# Patient Record
Sex: FEMALE | Employment: OTHER
[De-identification: names, ages, dates, MRNs, and addresses within clinical notes are randomized per-mention and may not be internally consistent; named-entity substitution may affect disease eponyms.]

---

## 2023-05-04 ENCOUNTER — TRANSCRIBE ORDERS (OUTPATIENT)
Dept: OTHER | Age: 73
End: 2023-05-04

## 2023-05-04 DIAGNOSIS — G35 MULTIPLE SCLEROSIS (H): Primary | ICD-10-CM

## 2023-05-10 ENCOUNTER — HOSPITAL ENCOUNTER (OUTPATIENT)
Dept: PHYSICAL THERAPY | Facility: CLINIC | Age: 73
Discharge: HOME | End: 2023-05-10
Attending: NURSE PRACTITIONER
Payer: COMMERCIAL

## 2023-05-10 DIAGNOSIS — G35 MULTIPLE SCLEROSIS (H): Primary | ICD-10-CM

## 2023-05-10 DIAGNOSIS — R29.898 WEAKNESS OF BOTH LOWER EXTREMITIES: ICD-10-CM

## 2023-05-10 DIAGNOSIS — Z74.09 IMPAIRED FUNCTIONAL MOBILITY, BALANCE, GAIT, AND ENDURANCE: ICD-10-CM

## 2023-05-10 PROCEDURE — 97162 PT EVAL MOD COMPLEX 30 MIN: CPT | Mod: GP | Performed by: PHYSICAL THERAPIST

## 2023-05-10 PROCEDURE — 97110 THERAPEUTIC EXERCISES: CPT | Mod: GP | Performed by: PHYSICAL THERAPIST

## 2023-05-14 NOTE — PROGRESS NOTES
05/10/23 1430   Quick Adds   Quick Adds Certification   Type of Visit Initial OP PT Evaluation   General Information   Start of Care Date 05/10/23   Referring Physician Joselyn Hernandez NP   Orders Evaluate and Treat as Indicated   Order Date 05/04/23   Medical Diagnosis MS   Onset of illness/injury or Date of Surgery 05/04/23  (date of order)   Surgical/Medical history reviewed Yes   Pertinent history of current problem (include personal factors and/or comorbidities that impact the POC) Pt was diagnosed with MS about 18 years ago.  She first developed fatigue many years ago and has gradually reduced strength and ease of mobility.  Current she has mm tone which worsens as the day progresses or during weather changes. PMH: arthritis, depression, HTN, incontienence, overweight, orthoscopic surgery on R knee, gallbladder. She has been taking baclofen for tone management with some relief. Currently completing the following exercises: squats, diagonal forward bends, trunk rotation with arms extended. Mobiility issues: difficulty getting up from chairs, walking, balance, stair climbing when she visits family.  Difficulty with car transfers and she needs to physically lift her legs by her pants with her hands to get them into the car. She also needs to use this method when moving in and out of bed. Pt uses 4WW for mobility and scooter for long distance   Patient/Family Goals Statement improve ease of walking and mobility   Fall Risk Screen   Fall screen completed by PT   Have you fallen 2 or more times in the past year? Yes   Have you fallen and had an injury in the past year? No   Timed Up and Go score (seconds) 66.47  (with walker)   Is patient a fall risk? Yes   Abuse Screen (yes response referral indicated)   Feels Unsafe at Home or Work/School no   Feels Threatened by Someone no   Does Anyone Try to Keep You From Having Contact with Others or Doing Things Outside Your Home? no   Physical Signs of Abuse Present no    Pain   Patient currently in pain No   Cognitive Status Examination   Orientation orientation to person, place and time   Level of Consciousness alert   Posture   Posture Forward head position;Protracted shoulders   Range of Motion (ROM)   ROM Comment PROM of B LEs WFL   Strength   Strength Comments Hip flexion: 3/5, hip abduction 2-/5, hip adduction 2/5, knee extension 3/5, knee flexion 2-/5, DF: 2/5   Bed Mobility   Bed Mobility Comments min to modA for LEs with supine<>sit on flat plynth.  Pt used arms to slide back onto plynth then pulled legs up using pants to moving into long sitting then into supine. Supine to long sit via use of UEs then moving to EOM with UEs moving her legs   Transfer Skills   Transfer Comments sit to stand with heavy UE support from armed chair, increased time to complete due to leg stiffness   Gait   Gait Comments Very slow speed, no knee flexion on R during swing phase leading to difficulty with foot clearance, very short step length and lacks heel strike B.   Gait Special Tests   Gait Special Tests 25 FOOT TIMED WALK   Gait Special Tests 25 Foot Timed Walk   Steps 26 Steps   Comments 10 MWT: 27.71 seconds.  27 steps with 4WW.  Gait speed: 0.2 m/s   Balance   Balance Comments Sitting balance: Independent; standing balance: pt requires B UE support for balance   Sensory Examination   Sensory Perception Comments intact to light touch in B LEs   Muscle Tone   Muscle Tone Comments increased tone in B LEs   Planned Therapy Interventions   Planned Therapy Interventions bed mobility training;gait training;neuromuscular re-education;strengthening;stretching;transfer training   Clinical Impression   Criteria for Skilled Therapeutic Interventions Met yes, treatment indicated   PT Diagnosis decreased functional moiblity   Influenced by the following impairments decreased strength, impaired balance, fall risk, increased mm tone   Functional limitations due to impairments diffiuclty with bed  mobility, transfers and gait leading to reduced participation and moiblity within and outside of the home   Clinical Presentation Evolving/Changing   Clinical Presentation Rationale presentation, PMH, weakness, moiblity issues, good family support   Clinical Decision Making (Complexity) Moderate complexity   Therapy Frequency 1 time/week   Predicted Duration of Therapy Intervention (days/wks) up to 90 days   Risk & Benefits of therapy have been explained Yes   Patient, Family & other staff in agreement with plan of care Yes   Education Assessment   Preferred Learning Style Demonstration;Pictures/video   Barriers to Learning No barriers   GOALS   PT Eval Goals 1;2;3;4;5   Goal 1   Goal Identifier TUG   Goal Description Pt will complete the timed up and go in 55 seconds with AAD in order to ease household mobility   Goal Progress baseline: 66 seconds with 4WW   Target Date 08/07/23   Goal 2   Goal Identifier Transfers   Goal Description Pt will be able to complete car transfer with SBA and appropriate equipment to lift legs in/out of vehicle   Target Date 08/07/23   Goal 3   Goal Identifier Bed moiblity   Goal Description Pt will be able to complete sit <>supine with approrpiate adaptive equipment with SBA in a timely manner.   Target Date 08/07/23   Goal 4   Goal Identifier Stairs   Goal Description Pt will be able to ascend/descend 4 steps with railings with CGA in order to enter family member's house   Target Date 08/07/23   Goal 5   Goal Identifier HEP   Goal Description Pt will be independent with a HEP to self manage symptoms   Target Date 08/07/23   Total Evaluation Time   PT Eval, Moderate Complexity Minutes (71072) 35   Therapy Certification   Certification date from 05/10/23   Certification date to 08/07/23   Medical Diagnosis MS

## 2023-05-14 NOTE — PROGRESS NOTES
[facility]                                                                                   OUTPATIENT PHYSICAL THERAPY FUNCTIONAL EVALUATION  PLAN OF TREATMENT FOR OUTPATIENT REHABILITATION  (COMPLETE FOR INITIAL CLAIMS ONLY)  Patient's Last Name, First Name, M.I.  YOB: 1950  Opal Bobo     Provider's Name   [facility]   Medical Record No.  [id number]     Start of Care Date:  05/10/23   Onset Date:  05/04/23 (date of order)   Type:     _X__PT   ____OT  ____SLP Medical Diagnosis:  MS     PT Diagnosis:  decreased functional moiblity Visits from SOC:  1                              __________________________________________________________________________________  Plan of Treatment/Functional Goals:  bed mobility training, gait training, neuromuscular re-education, strengthening, stretching, transfer training           GOALS  TUG  Pt will complete the timed up and go in 55 seconds with AAD in order to ease household mobility  08/07/23    Transfers  Pt will be able to complete car transfer with SBA and appropriate equipment to lift legs in/out of vehicle  08/07/23    Bed moiblity  Pt will be able to complete sit <>supine with approrpiate adaptive equipment with SBA in a timely manner.  08/07/23    Stairs  Pt will be able to ascend/descend 4 steps with railings with CGA in order to enter family member's house  08/07/23    HEP  Pt will be independent with a HEP to self manage symptoms  08/07/23            Therapy Frequency:  1 time/week   Predicted Duration of Therapy Intervention:  up to 90 days    Filemon Thomas, PT                                    I CERTIFY THE NEED FOR THESE SERVICES FURNISHED UNDER        THIS PLAN OF TREATMENT AND WHILE UNDER MY CARE .             Physician Signature                Date    X_____________________________________________________                      Certification Date From:  05/10/23   Certification Date To:  08/07/23    Referring Provider:  Joselyn Hernandez NP    Initial Assessment  See Epic Evaluation- Start of Care Date: 05/10/23

## 2023-05-25 ENCOUNTER — THERAPY VISIT (OUTPATIENT)
Dept: PHYSICAL THERAPY | Facility: CLINIC | Age: 73
End: 2023-05-25
Payer: COMMERCIAL

## 2023-05-25 DIAGNOSIS — Z74.09 IMPAIRED FUNCTIONAL MOBILITY, BALANCE, GAIT, AND ENDURANCE: ICD-10-CM

## 2023-05-25 DIAGNOSIS — G35 MULTIPLE SCLEROSIS (H): Primary | ICD-10-CM

## 2023-05-25 DIAGNOSIS — R29.898 WEAKNESS OF BOTH LOWER EXTREMITIES: ICD-10-CM

## 2023-05-25 PROCEDURE — 97530 THERAPEUTIC ACTIVITIES: CPT | Mod: GP | Performed by: PHYSICAL THERAPIST

## 2023-06-05 ENCOUNTER — THERAPY VISIT (OUTPATIENT)
Dept: PHYSICAL THERAPY | Facility: CLINIC | Age: 73
End: 2023-06-05
Payer: COMMERCIAL

## 2023-06-05 DIAGNOSIS — R29.898 WEAKNESS OF BOTH LOWER EXTREMITIES: ICD-10-CM

## 2023-06-05 DIAGNOSIS — G35 MULTIPLE SCLEROSIS (H): Primary | ICD-10-CM

## 2023-06-05 DIAGNOSIS — Z74.09 IMPAIRED FUNCTIONAL MOBILITY, BALANCE, GAIT, AND ENDURANCE: ICD-10-CM

## 2023-06-05 PROCEDURE — 97110 THERAPEUTIC EXERCISES: CPT | Mod: GP | Performed by: PHYSICAL THERAPIST

## 2023-06-12 ENCOUNTER — THERAPY VISIT (OUTPATIENT)
Dept: PHYSICAL THERAPY | Facility: CLINIC | Age: 73
End: 2023-06-12
Payer: COMMERCIAL

## 2023-06-12 DIAGNOSIS — Z74.09 IMPAIRED FUNCTIONAL MOBILITY, BALANCE, GAIT, AND ENDURANCE: ICD-10-CM

## 2023-06-12 DIAGNOSIS — G35 MULTIPLE SCLEROSIS (H): Primary | ICD-10-CM

## 2023-06-12 DIAGNOSIS — R29.898 WEAKNESS OF BOTH LOWER EXTREMITIES: ICD-10-CM

## 2023-06-12 PROCEDURE — 97530 THERAPEUTIC ACTIVITIES: CPT | Mod: GP | Performed by: PHYSICAL THERAPIST

## 2023-06-22 ENCOUNTER — THERAPY VISIT (OUTPATIENT)
Dept: PHYSICAL THERAPY | Facility: CLINIC | Age: 73
End: 2023-06-22
Payer: COMMERCIAL

## 2023-06-22 DIAGNOSIS — R29.898 WEAKNESS OF BOTH LOWER EXTREMITIES: ICD-10-CM

## 2023-06-22 DIAGNOSIS — G35 MULTIPLE SCLEROSIS (H): Primary | ICD-10-CM

## 2023-06-22 DIAGNOSIS — Z74.09 IMPAIRED FUNCTIONAL MOBILITY, BALANCE, GAIT, AND ENDURANCE: ICD-10-CM

## 2023-06-22 PROCEDURE — 97112 NEUROMUSCULAR REEDUCATION: CPT | Mod: GP | Performed by: PHYSICAL THERAPIST

## 2023-06-22 PROCEDURE — 97530 THERAPEUTIC ACTIVITIES: CPT | Mod: GP | Performed by: PHYSICAL THERAPIST

## 2023-06-28 ENCOUNTER — THERAPY VISIT (OUTPATIENT)
Dept: PHYSICAL THERAPY | Facility: CLINIC | Age: 73
End: 2023-06-28
Payer: COMMERCIAL

## 2023-06-28 DIAGNOSIS — R29.898 WEAKNESS OF BOTH LOWER EXTREMITIES: ICD-10-CM

## 2023-06-28 DIAGNOSIS — Z74.09 IMPAIRED FUNCTIONAL MOBILITY, BALANCE, GAIT, AND ENDURANCE: ICD-10-CM

## 2023-06-28 DIAGNOSIS — G35 MULTIPLE SCLEROSIS (H): Primary | ICD-10-CM

## 2023-06-28 PROCEDURE — 97110 THERAPEUTIC EXERCISES: CPT | Mod: GP | Performed by: PHYSICAL THERAPIST

## 2023-07-05 ENCOUNTER — THERAPY VISIT (OUTPATIENT)
Dept: PHYSICAL THERAPY | Facility: CLINIC | Age: 73
End: 2023-07-05
Payer: COMMERCIAL

## 2023-07-05 DIAGNOSIS — Z74.09 IMPAIRED FUNCTIONAL MOBILITY, BALANCE, GAIT, AND ENDURANCE: ICD-10-CM

## 2023-07-05 DIAGNOSIS — G35 MULTIPLE SCLEROSIS (H): Primary | ICD-10-CM

## 2023-07-05 DIAGNOSIS — R29.898 WEAKNESS OF BOTH LOWER EXTREMITIES: ICD-10-CM

## 2023-07-05 PROCEDURE — 97110 THERAPEUTIC EXERCISES: CPT | Mod: GP | Performed by: PHYSICAL THERAPIST

## 2023-07-13 ENCOUNTER — THERAPY VISIT (OUTPATIENT)
Dept: PHYSICAL THERAPY | Facility: CLINIC | Age: 73
End: 2023-07-13
Payer: COMMERCIAL

## 2023-07-13 DIAGNOSIS — Z74.09 IMPAIRED FUNCTIONAL MOBILITY, BALANCE, GAIT, AND ENDURANCE: ICD-10-CM

## 2023-07-13 DIAGNOSIS — G35 MULTIPLE SCLEROSIS (H): Primary | ICD-10-CM

## 2023-07-13 DIAGNOSIS — R29.898 WEAKNESS OF BOTH LOWER EXTREMITIES: ICD-10-CM

## 2023-07-13 PROCEDURE — 97110 THERAPEUTIC EXERCISES: CPT | Mod: GP | Performed by: PHYSICAL THERAPIST

## 2023-07-13 PROCEDURE — 97530 THERAPEUTIC ACTIVITIES: CPT | Mod: GP | Performed by: PHYSICAL THERAPIST

## 2023-07-16 NOTE — PROGRESS NOTES
07/13/23 0500   Appointment Info   Signing clinician's name / credentials Filemon Thomas, PT, DPT, NCS   Visits Used 8   Medical Diagnosis MS   PT Tx Diagnosis decreased functional moiblity   Progress Note/Certification   Start of Care Date 05/10/23   Onset of illness/injury or Date of Surgery 05/04/23   Therapy Frequency 1x/wk   Predicted Duration up to 90 days   Certification date from 05/10/23   Certification date to 08/07/23   GOALS   PT Goals 2;3;4;5   PT Goal 1   Goal Identifier TUG   Goal Description Pt will complete the timed up and go in 55 seconds with AAD in order to ease household mobility   Goal Progress MET: Pt progressed from 66 seconds to 50 seconds with 4WW.  She still remains high fall risk, but overall demonstrates good safety with transfers and turns.   Target Date 08/07/23   Date Met 07/13/23   PT Goal 2   Goal Identifier Transfers   Goal Description Pt will be able to complete car transfer with SBA and appropriate equipment to lift legs in/out of vehicle   Goal Progress Pt reports being able to get in/out of her vehicle without A. She needs assistance for getting into her children's jeeps and SUV/trucks due to the height   Target Date 08/07/23   PT Goal 3   Goal Identifier Bed moiblity   Goal Description Pt will be able to complete sit <>supine with approrpiate adaptive equipment with SBA in a timely manner.   Goal Progress Pt able to completed sit<>supine in less than 30 seconds   Target Date 08/07/23   Date Met 07/13/23   PT Goal 4   Goal Identifier Stairs   Goal Description Pt will be able to ascend/descend 4 steps with railings with CGA in order to enter family member's house   Goal Progress Pt is able to climb 8 steps with B HR and CGA.  There are no railings at her children's houses, however she has been able to get into other buidlings with more ease when railings are present   Target Date 08/07/23   Date Met 07/13/23   PT Goal 5   Goal Identifier HEP   Goal Description Pt will be  independent with a HEP to self manage symptoms   Goal Progress Pt has been independent with her HEP on a daily basis. She was able to recall almost all the exercises without A.   Target Date 23   Date Met 23   Subjective Report   Subjective Report Pt reports compliance to HEP. She has a leg  to use when she is unable to lift her leg on her own.   Treatment Interventions (PT)   Interventions Neuromuscular Re-education   Therapeutic Procedure/Exercise   Therapeutic Procedures: strength, endurance, ROM, flexibillity minutes (75291) 24   Ther Proc 1 - Details SEATED:  hip/knee flexion with A x 5 reps each side; sit to stand x 4 reps - VC for full upright posture.  Seated trunk diagonals for abs x 10 reps, Seated elbow for knee twist x 10 reps. STANDING:  Standing punches x 20 reps, marching in place x 10 reps with UE support.   Skilled Intervention LE strenghening, cues, modifications to achieve active movements   Patient Response/Progress Pt demo'd increased fatigue this date with walking tasks, however continues to be motivated throughout session   Therapeutic Activity   Therapeutic Activities: dynamic activities to improve functional performance minutes (62304) 20   Ther Act 1 - Details TU seconds with 4WW.  Stair climbing with B railings and CGA.  4 steps x 2 reps without rest break. Step to patterns. minor VC to get entire foot onto step.  Bed mobility x 3 reps with mod I.   Skilled Intervention In order to improve independence with functional mobility   Patient Response/Progress Pt is able to complete mobility in reduced time and more efficient movements.   Education   Learner/Method Patient;Family;Demonstration   Plan   Plan for next session discharge from PT   Total Session Time   Timed Code Treatment Minutes 44   Total Treatment Time (sum of timed and untimed services) 44         DISCHARGE  Reason for Discharge: Patient has met all goals.    Equipment Issued: pt purchased a leg  arsenio    Discharge Plan: Patient to continue home program.    Referring Provider:  Joselyn Rivera

## 2023-10-18 ENCOUNTER — HOSPITAL ENCOUNTER (OUTPATIENT)
Facility: CLINIC | Age: 73
Setting detail: OBSERVATION
Discharge: HOME | End: 2023-10-19
Attending: INTERNAL MEDICINE | Admitting: INTERNAL MEDICINE
Payer: COMMERCIAL

## 2023-10-18 ENCOUNTER — APPOINTMENT (OUTPATIENT)
Dept: PHYSICAL THERAPY | Facility: CLINIC | Age: 73
End: 2023-10-18
Attending: INTERNAL MEDICINE
Payer: COMMERCIAL

## 2023-10-18 DIAGNOSIS — G35 MS (MULTIPLE SCLEROSIS) (H): ICD-10-CM
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
## 2023-10-18 DIAGNOSIS — N39.0 URINARY TRACT INFECTION WITHOUT HEMATURIA, SITE UNSPECIFIED: Primary | ICD-10-CM

## 2023-10-18 PROBLEM — R29.6 FALLS: Status: ACTIVE | Noted: 2023-10-18

## 2023-10-18 LAB
ANION GAP SERPL CALCULATED.3IONS-SCNC: 10 MMOL/L (ref 7–15)
BUN SERPL-MCNC: 13.2 MG/DL (ref 8–23)
CALCIUM SERPL-MCNC: 8.7 MG/DL (ref 8.8–10.2)
CHLORIDE SERPL-SCNC: 107 MMOL/L (ref 98–107)
CREAT SERPL-MCNC: 0.6 MG/DL (ref 0.51–0.95)
DEPRECATED HCO3 PLAS-SCNC: 26 MMOL/L (ref 22–29)
EGFRCR SERPLBLD CKD-EPI 2021: >90 ML/MIN/1.73M2
ERYTHROCYTE [DISTWIDTH] IN BLOOD BY AUTOMATED COUNT: 12.8 % (ref 10–15)
GLUCOSE SERPL-MCNC: 93 MG/DL (ref 70–99)
HCT VFR BLD AUTO: 41.3 % (ref 35–47)
HGB BLD-MCNC: 13.7 G/DL (ref 11.7–15.7)
MCH RBC QN AUTO: 32.6 PG (ref 26.5–33)
MCHC RBC AUTO-ENTMCNC: 33.2 G/DL (ref 31.5–36.5)
MCV RBC AUTO: 98 FL (ref 78–100)
PLATELET # BLD AUTO: 203 10E3/UL (ref 150–450)
POTASSIUM SERPL-SCNC: 3.6 MMOL/L (ref 3.4–5.3)
RBC # BLD AUTO: 4.2 10E6/UL (ref 3.8–5.2)
SODIUM SERPL-SCNC: 143 MMOL/L (ref 135–145)
WBC # BLD AUTO: 3.8 10E3/UL (ref 4–11)

## 2023-10-18 PROCEDURE — 250N000011 HC RX IP 250 OP 636: Mod: JZ | Performed by: HOSPITALIST

## 2023-10-18 PROCEDURE — 85027 COMPLETE CBC AUTOMATED: CPT | Performed by: INTERNAL MEDICINE

## 2023-10-18 PROCEDURE — 99207 PR APP CREDIT; MD BILLING SHARED VISIT: CPT | Performed by: HOSPITALIST

## 2023-10-18 PROCEDURE — G0379 DIRECT REFER HOSPITAL OBSERV: HCPCS

## 2023-10-18 PROCEDURE — 99222 1ST HOSP IP/OBS MODERATE 55: CPT | Performed by: INTERNAL MEDICINE

## 2023-10-18 PROCEDURE — 36415 COLL VENOUS BLD VENIPUNCTURE: CPT | Performed by: INTERNAL MEDICINE

## 2023-10-18 PROCEDURE — 97530 THERAPEUTIC ACTIVITIES: CPT | Mod: GP

## 2023-10-18 PROCEDURE — 97161 PT EVAL LOW COMPLEX 20 MIN: CPT | Mod: GP

## 2023-10-18 PROCEDURE — 250N000013 HC RX MED GY IP 250 OP 250 PS 637: Performed by: INTERNAL MEDICINE

## 2023-10-18 PROCEDURE — 96372 THER/PROPH/DIAG INJ SC/IM: CPT | Performed by: HOSPITALIST

## 2023-10-18 PROCEDURE — 80048 BASIC METABOLIC PNL TOTAL CA: CPT | Performed by: INTERNAL MEDICINE

## 2023-10-18 PROCEDURE — 97116 GAIT TRAINING THERAPY: CPT | Mod: GP

## 2023-10-18 PROCEDURE — G0378 HOSPITAL OBSERVATION PER HR: HCPCS

## 2023-10-18 PROCEDURE — 250N000013 HC RX MED GY IP 250 OP 250 PS 637: Performed by: HOSPITALIST

## 2023-10-18 RX ORDER — ACETAMINOPHEN 325 MG/1
650 TABLET ORAL EVERY 6 HOURS PRN
Status: DISCONTINUED | OUTPATIENT
Start: 2023-10-18 | End: 2023-10-19 | Stop reason: HOSPADM

## 2023-10-18 RX ORDER — PRAMIPEXOLE DIHYDROCHLORIDE 0.12 MG/1
0.12 TABLET ORAL AT BEDTIME
Status: DISCONTINUED | OUTPATIENT
Start: 2023-10-18 | End: 2023-10-19 | Stop reason: HOSPADM

## 2023-10-18 RX ORDER — BACLOFEN 10 MG/1
10 TABLET ORAL 4 TIMES DAILY
COMMUNITY

## 2023-10-18 RX ORDER — DALFAMPRIDINE 10 MG/1
10 TABLET, FILM COATED, EXTENDED RELEASE ORAL 2 TIMES DAILY
COMMUNITY

## 2023-10-18 RX ORDER — PANTOPRAZOLE SODIUM 40 MG/1
40 TABLET, DELAYED RELEASE ORAL DAILY
Status: DISCONTINUED | OUTPATIENT
Start: 2023-10-18 | End: 2023-10-19 | Stop reason: HOSPADM

## 2023-10-18 RX ORDER — TERIFLUNOMIDE 14 MG/1
14 TABLET, FILM COATED ORAL DAILY
Status: DISCONTINUED | OUTPATIENT
Start: 2023-10-18 | End: 2023-10-19 | Stop reason: HOSPADM

## 2023-10-18 RX ORDER — MULTIPLE VITAMINS W/ MINERALS TAB 9MG-400MCG
1 TAB ORAL DAILY
COMMUNITY

## 2023-10-18 RX ORDER — BACLOFEN 10 MG/1
10 TABLET ORAL 4 TIMES DAILY
Status: DISCONTINUED | OUTPATIENT
Start: 2023-10-18 | End: 2023-10-19 | Stop reason: HOSPADM

## 2023-10-18 RX ORDER — PAROXETINE 20 MG/1
20 TABLET, FILM COATED ORAL
COMMUNITY

## 2023-10-18 RX ORDER — TERIFLUNOMIDE 14 MG/1
14 TABLET, FILM COATED ORAL
COMMUNITY

## 2023-10-18 RX ORDER — DALFAMPRIDINE 10 MG/1
10 TABLET, FILM COATED, EXTENDED RELEASE ORAL 2 TIMES DAILY
Status: DISCONTINUED | OUTPATIENT
Start: 2023-10-18 | End: 2023-10-19 | Stop reason: HOSPADM

## 2023-10-18 RX ORDER — PAROXETINE 20 MG/1
20 TABLET, FILM COATED ORAL DAILY
Status: DISCONTINUED | OUTPATIENT
Start: 2023-10-18 | End: 2023-10-19 | Stop reason: HOSPADM

## 2023-10-18 RX ORDER — GABAPENTIN 100 MG/1
100 CAPSULE ORAL AT BEDTIME
Status: ON HOLD | COMMUNITY
End: 2023-10-18

## 2023-10-18 RX ORDER — ENOXAPARIN SODIUM 100 MG/ML
40 INJECTION SUBCUTANEOUS EVERY 24 HOURS
Status: DISCONTINUED | OUTPATIENT
Start: 2023-10-18 | End: 2023-10-19 | Stop reason: HOSPADM

## 2023-10-18 RX ORDER — TRIAMTERENE/HYDROCHLOROTHIAZID 37.5-25 MG
1 TABLET ORAL DAILY
Status: DISCONTINUED | OUTPATIENT
Start: 2023-10-18 | End: 2023-10-19 | Stop reason: HOSPADM

## 2023-10-18 RX ORDER — AMOXICILLIN 250 MG
2 CAPSULE ORAL 2 TIMES DAILY PRN
Status: DISCONTINUED | OUTPATIENT
Start: 2023-10-18 | End: 2023-10-19 | Stop reason: HOSPADM

## 2023-10-18 RX ORDER — CIPROFLOXACIN 250 MG/1
250 TABLET, FILM COATED ORAL EVERY 12 HOURS SCHEDULED
Status: DISCONTINUED | OUTPATIENT
Start: 2023-10-18 | End: 2023-10-19 | Stop reason: HOSPADM

## 2023-10-18 RX ORDER — TRIAMTERENE/HYDROCHLOROTHIAZID 37.5-25 MG
1 TABLET ORAL DAILY
COMMUNITY

## 2023-10-18 RX ORDER — ONDANSETRON 2 MG/ML
4 INJECTION INTRAMUSCULAR; INTRAVENOUS EVERY 6 HOURS PRN
Status: DISCONTINUED | OUTPATIENT
Start: 2023-10-18 | End: 2023-10-19 | Stop reason: HOSPADM

## 2023-10-18 RX ORDER — VANCOMYCIN HYDROCHLORIDE 1 G/200ML
1000 INJECTION, SOLUTION INTRAVENOUS EVERY 24 HOURS
Status: DISCONTINUED | OUTPATIENT
Start: 2023-10-19 | End: 2023-10-18

## 2023-10-18 RX ORDER — ACETAMINOPHEN 650 MG/1
650 SUPPOSITORY RECTAL EVERY 6 HOURS PRN
Status: DISCONTINUED | OUTPATIENT
Start: 2023-10-18 | End: 2023-10-19 | Stop reason: HOSPADM

## 2023-10-18 RX ORDER — PRAMIPEXOLE DIHYDROCHLORIDE 0.12 MG/1
0.12 TABLET ORAL AT BEDTIME
COMMUNITY

## 2023-10-18 RX ORDER — AMITRIPTYLINE HYDROCHLORIDE 10 MG/1
10 TABLET ORAL AT BEDTIME
COMMUNITY

## 2023-10-18 RX ORDER — GABAPENTIN 100 MG/1
100 CAPSULE ORAL AT BEDTIME
Status: DISCONTINUED | OUTPATIENT
Start: 2023-10-18 | End: 2023-10-18

## 2023-10-18 RX ORDER — ACETAMINOPHEN 325 MG/1
325-650 TABLET ORAL EVERY 6 HOURS PRN
COMMUNITY

## 2023-10-18 RX ORDER — AMOXICILLIN 250 MG
1 CAPSULE ORAL 2 TIMES DAILY PRN
Status: DISCONTINUED | OUTPATIENT
Start: 2023-10-18 | End: 2023-10-19 | Stop reason: HOSPADM

## 2023-10-18 RX ORDER — ONDANSETRON 4 MG/1
4 TABLET, ORALLY DISINTEGRATING ORAL EVERY 6 HOURS PRN
Status: DISCONTINUED | OUTPATIENT
Start: 2023-10-18 | End: 2023-10-19 | Stop reason: HOSPADM

## 2023-10-18 RX ORDER — AMITRIPTYLINE HYDROCHLORIDE 10 MG/1
10 TABLET ORAL AT BEDTIME
Status: DISCONTINUED | OUTPATIENT
Start: 2023-10-18 | End: 2023-10-19 | Stop reason: HOSPADM

## 2023-10-18 RX ADMIN — CIPROFLOXACIN HYDROCHLORIDE 250 MG: 250 TABLET, FILM COATED ORAL at 20:34

## 2023-10-18 RX ADMIN — TRIAMTERENE AND HYDROCHLOROTHIAZIDE 1 TABLET: 37.5; 25 TABLET ORAL at 12:28

## 2023-10-18 RX ADMIN — PRAMIPEXOLE DIHYDROCHLORIDE 0.12 MG: 0.12 TABLET ORAL at 21:23

## 2023-10-18 RX ADMIN — BACLOFEN 10 MG: 10 TABLET ORAL at 12:28

## 2023-10-18 RX ADMIN — ENOXAPARIN SODIUM 40 MG: 40 INJECTION SUBCUTANEOUS at 12:28

## 2023-10-18 RX ADMIN — PANTOPRAZOLE SODIUM 40 MG: 40 TABLET, DELAYED RELEASE ORAL at 12:28

## 2023-10-18 RX ADMIN — PAROXETINE HYDROCHLORIDE 20 MG: 20 TABLET, FILM COATED ORAL at 13:59

## 2023-10-18 RX ADMIN — CIPROFLOXACIN HYDROCHLORIDE 250 MG: 250 TABLET, FILM COATED ORAL at 09:08

## 2023-10-18 RX ADMIN — AMITRIPTYLINE HYDROCHLORIDE 10 MG: 10 TABLET, FILM COATED ORAL at 21:23

## 2023-10-18 RX ADMIN — BACLOFEN 10 MG: 10 TABLET ORAL at 16:55

## 2023-10-18 RX ADMIN — TERIFLUNOMIDE 14 MG: 14 TABLET, FILM COATED ORAL at 16:55

## 2023-10-18 RX ADMIN — BACLOFEN 10 MG: 10 TABLET ORAL at 20:34

## 2023-10-18 RX ADMIN — DALFAMPRIDINE 10 MG: 10 TABLET, FILM COATED, EXTENDED RELEASE ORAL at 20:34

## 2023-10-18 ASSESSMENT — ACTIVITIES OF DAILY LIVING (ADL)
ADLS_ACUITY_SCORE: 35
ADLS_ACUITY_SCORE: 36
ADLS_ACUITY_SCORE: 36

## 2023-10-18 NOTE — PLAN OF CARE
Goal Outcome Evaluation:       PRIMARY DIAGNOSIS: GENERALIZED WEAKNESS    OUTPATIENT/OBSERVATION GOALS TO BE MET BEFORE DISCHARGE  1. Orthostatic performed: No    2. Tolerating PO medications: Yes    3. Return to near baseline physical activity: No    4. Cleared for discharge by consultants (if involved): No    Discharge Planner Nurse   Safe discharge environment identified: No  Barriers to discharge: Yes       Entered by: Dee Dee Antoine RN 10/18/2023 10:19 AM   Pt A/Ox4. VSS. RA. IV SL. Tolerating regular diet. Up 1 assist with walker to bathroom. 2 episodes of loose stools this morning. Rule out c-diff ordered, collection pending, enteric precautions until resulted. Pt consulted.  Please review provider order for any additional goals.   Nurse to notify provider when observation goals have been met and patient is ready for discharge.

## 2023-10-18 NOTE — PROGRESS NOTES
"[facility]    Medicine Progress Note - Hospitalist Service    Date of Admission:  10/18/2023    Assessment & Plan   Opal Bobo is a 73 year old female patient with past medical history of multiple sclerosis, ataxic gait, optic neuropathy, restless leg syndrome, hypertension, depression, who lives independently was sent for evaluation for frequent falls.    Found at Urgent Care to have UTI. Direct admission to [facility].     Falls  History of ataxic gait  Multiple sclerosis    - cont home: baclofen, dalfampridne, teriflunomide    - follows with Dr Caicedo: his last note was reviewed    - has prior history of falls    - lives independently    - was seen at urgent care in [place] and was sent for direct admit for treatment of UTI, PT evaluation and recommendations for disposition    - PT consult is pending     Abnormal urinalysis    - UA done at Urgent Care appeared infected    - culture is pending    - started on Cipro, cont    History of hypertension    - cont home Maxzide    Restless legs    - cont mirapex    Depression    - cont prozac and amitriptyline    Updated daughter in person. Reviewed meds. Made corrections and informed pharmacy.       Observation Goals: -diagnostic tests and consults completed and resulted, -vital signs normal or at patient baseline, Nurse to notify provider when observation goals have been met and patient is ready for discharge.  Diet: Regular Diet Adult    DVT Prophylaxis: Enoxaparin (Lovenox) SQ  [place] Catheter: Not present  Lines: None     Cardiac Monitoring: None  Code Status: Full Code      Clinically Significant Risk Factors Present on Admission                       # Overweight: Estimated body mass index is 29.18 kg/m  as calculated from the following:    Height as of this encounter: 1.676 m (5' 6\").    Weight as of this encounter: 82 kg (180 lb 12.4 oz).              Disposition Plan      Expected Discharge Date: 10/19/2023                    Rufus" Valerie King MD  Hospitalist Service  [facility]  Securely message with [employer] (more info)  Text page via [facility] Paging/Directory   ______________________________________________________________________    Interval History   Patient is in the bed. She feels well. No complaints. Had two loose stools this am which can be normal for her. Eating.     Physical Exam   Vital Signs: Temp: 97.8  F (36.6  C) Temp src: Oral BP: (!) 161/83 Pulse: 75   Resp: 18 SpO2: 99 % O2 Device: None (Room air)    Weight: 180 lbs 12.44 oz    Constitutional: awake, alert, cooperative, no apparent distress, and appears stated age  Eyes: Lids and lashes normal, pupils equal, round and reactive to light, extra ocular muscles intact, sclera clear, conjunctiva normal  ENT: Normocephalic, without obvious abnormality, atraumatic, sinuses nontender on palpation, external ears without lesions, oral pharynx with moist mucous membranes, tonsils without erythema or exudates, gums normal and good dentition.  Respiratory: No increased work of breathing, good air exchange, clear to auscultation bilaterally, no crackles or wheezing  Cardiovascular: Normal apical impulse, regular rate and rhythm, normal S1 and S2, no S3 or S4, and no murmur noted  GI: No scars, normal bowel sounds, soft, non-distended, non-tender, no masses palpated, no hepatosplenomegally  Skin: no bruising or bleeding    Medical Decision Making       45 MINUTES SPENT BY ME on the date of service doing chart review, history, exam, documentation & further activities per the note.      Data     I have personally reviewed the following data over the past 24 hrs:    3.8 (L)  \   13.7 / 203     143 107 13.2 / 93   3.6 26 0.60 \       Imaging results reviewed over the past 24 hrs:   Recent Results (from the past 24 hour(s))   XR PELVIS 1 OR 2 VIEWS    Narrative    For Patients:  As a result of the 21st Century Cures Act, medical imaging exams  and procedure reports are  released immediately into your electronic medical  record.  You may view this report before your referring provider.  If you have  questions, please contact your health care provider.      Indication:  Left lower extremity pain, multiple falls.    Technique:  Pelvis 1 view.    Comparison:  None.    Findings:  Bones: Denies osteopenia. Alignment is normal. No fractures or bone lesions.    Joint spaces: Moderate arthrosis of the bilateral hips. Degenerative change in  lumbar spine and SI joints.     Soft tissues: Mild fecal retention within the visualized colon and rectum.     Impression:  No evidence of fracture.        Dictated by Jose Brannon MD @ 10/17/2023 8:36:01 PM    Electronically Signed   XR FEMUR LEFT 2 OR MORE VIEWS    Narrative    For Patients:  As a result of the 21st Century Cures Act, medical imaging exams  and procedure reports are released immediately into your electronic medical  record.  You may view this report before your referring provider.  If you have  questions, please contact your health care provider.      Indication:  Left lower extremity pain, multiple falls    Technique:  Left femur, 2 views.    Comparison:  None.    Findings:  No evidence of left femur fracture. Osteopenia. Knee effusion. Degenerative  changes of the hip and knee joints.     Impression:  No evidence of left femur fracture.        Dictated by Jose Brannon MD @ 10/17/2023 8:37:26 PM    Electronically Signed   XR TIBIA AND FIBULA LEFT 2 VIEWS    Narrative    For Patients:  As a result of the 21st Century Cures Act, medical imaging exams  and procedure reports are released immediately into your electronic medical  record.  You may view this report before your referring provider.  If you have  questions, please contact your health care provider.      Indication:  Multiple falls with left lower extremity pain     Technique:  Frontal and lateral views the left tibia and fibula.    Comparison:  This study was read in  conjunction with left knee radiographs from same day.    Findings:  Bones: Alignment is normal. No fractures or bone lesions.      Joint spaces: Unremarkable.      Soft tissues: Unremarkable.      Impression:  Negative.        Dictated by Chiquita Vaca MD @ 10/17/2023 8:41:06 PM    Electronically Signed   XR KNEE LEFT 3 VIEWS    Narrative    For Patients:  As a result of the 21st Century Cures Act, medical imaging exams  and procedure reports are released immediately into your electronic medical  record.  You may view this report before your referring provider.  If you have  questions, please contact your health care provider.      Indication:  Left lower extremity pain, left knee bruising, multiple falls    Technique:  Left knee 3 views     Comparison:  None      Impression:  No evidence of fracture. Moderate-sized knee effusion.    Moderate tricompartmental degenerative arthrosis.        Dictated by Jose Brannon MD @ 10/17/2023 8:33:08 PM    Electronically Signed

## 2023-10-18 NOTE — PLAN OF CARE
PRIMARY DIAGNOSIS: GENERALIZED WEAKNESS    OUTPATIENT/OBSERVATION GOALS TO BE MET BEFORE DISCHARGE  1. Orthostatic performed: NA    2. Tolerating PO medications: Yes    3. Return to near baseline physical activity: No    4. Cleared for discharge by consultants (if involved): No    Discharge Planner Nurse   Safe discharge environment identified: No  Barriers to discharge: Yes       Entered by: Shanice Haq RN 10/18/2023 2:14 AM  AOx4, VSS, Assist 2 with gait belt and walker, denies pain, mild edema bilateral LLE, redness on bilateral LLE, has mepi plex on bottom of left foot.   Please review provider order for any additional goals.   Nurse to notify provider when observation goals have been met and patient is ready for discharge.Goal Outcome Evaluation:

## 2023-10-18 NOTE — H&P
[facility]    History and Physical  Hospitalist       Date of Admission:  10/18/2023  Date of Service (when I saw the patient): 10/18/23    Assessment & Plan   Opal Bobo is a 73 year old female patient with past medical history of multiple sclerosis, ataxic gait, optic neuropathy, restless leg syndrome, hypertension, depression, who lives independently was sent for evaluation for frequent falls.  Patient is stated that she had a fall on Monday and again on Sunday.  She also states that she had prior history of falls. She was seen at urgent care in [place] and was sent for direct admit for further evaluation and possible placement.  Per chart review, patient was diagnosed with MS about 18 years ago.  Patient has been having intermittent falls.  She had a fall on Sunday and again on Monday.  She denies fever, cough, shortness of breath, nausea, vomiting, abdominal pain, dysuria, urgency or frequency.  On arrival to observation unit, her vital signs showed temperature 97.5, pulse 69, blood pressure 168/92, oxygen saturation 98% on room air.  Laboratory work-up found urgent care showed WBC 20-50, 2+ leukocyte esterase.  Urine culture was ordered.  Antibiotic was not administered.  Patient was sent for direct admission for further evaluation.    Falls  History of ataxic gait  Multiple sclerosis  She has prior history of falls.  She lives independently.  She was seen at urgent care in [place] and was sent for direct admit for PT evaluation and recommendations for disposition. Currently has no evidence of trauma.  -We will consult physical therapy    Abnormal urinalysis  Urinalysis abnormal with WBC 20-50/hpf, 2+ leukocyte esterase.  Urine culture collected at urgent care.  She was not given antibiotic.  Currently denies any dysuria, urgency or frequency.  Urinalysis abnormal.  Has history of cephalosporin allergy. Will start her on empiric Cipro. Urine culture pending    History of  hypertension  -We will resume PTA medication once reviewed by pharmacy    We will admit to observation unit    DVT Prophylaxis: Pneumatic Compression Devices  Code Status: Full Code    Disposition: Expected discharge in  1-2 days     Mai Mims MD    Primary Care Physician   Augustine Song    Chief Complaint   Frequent falls    History is obtained from the patient    History of Present Illness   Opal Bobo is a 73 year old female patient with past medical history of multiple sclerosis, ataxic gait, optic neuropathy, restless leg syndrome, hypertension, depression, who lives independently was sent for evaluation for frequent falls.  Patient is stated that she had a fall on Monday and again on Sunday.  She also states that she had prior history of falls. She was seen at urgent care in [place] and was sent for direct admit for further evaluation and possible placement.  Per chart review, patient was diagnosed with MS about 18 years ago.  She has been having ataxic gait for which neurology recommended physical therapy.  Patient also states that she has leg edema.  She denies history of congestive heart failure.  She denies dysuria, urgency or frequency.  She has no vomiting or diarrhea.  Her vital signs showed temperature 97.5, pulse 69, blood pressure 168/92, oxygen saturation 98% on room air.  Laboratory work-up found urgent care showed WBC 20-50, 2+ leukocyte esterase.  Urine culture was ordered.  Antibiotic was not administered.  Patient was sent for direct admission for further evaluation.    Past Medical History    I have reviewed this patient's medical history and updated it with pertinent information if needed.   Multiple sclerosis  Ataxic gait  Optic neuropathy  Restless leg syndrome    Past Surgical History   I have reviewed this patient's surgical history and updated it with pertinent information if needed.  No past surgical history on file.    Prior to Admission Medications   None      Allergies   Allergies   Allergen Reactions    Clindamycin/Lincomycin     Cefadroxil        Social History   I have reviewed this patient's social history and updated it with pertinent information if needed. Opal Bobo      Family History   I have reviewed this patient's family history and updated it with pertinent information if needed.   No family history on file.    Review of Systems   The 10 point Review of Systems is negative other than noted in the HPI or here. Frequent falls     Physical Exam   Temp: 97.5  F (36.4  C) Temp src: Axillary BP: (!) 168/92 Pulse: 69   Resp: 20 SpO2: 98 % O2 Device: None (Room air)    Vital Signs with Ranges  Temp:  [97.5  F (36.4  C)] 97.5  F (36.4  C)  Pulse:  [69] 69  Resp:  [20] 20  BP: (168)/(92) 168/92  SpO2:  [98 %] 98 %  180 lbs 12.44 oz    GEN:  Alert, oriented x 3, appears comfortable, NAD.  HEENT:  Normocephalic/atraumatic, no scleral icterus, no nasal discharge, mouth moist.  CV:  Regular rate and rhythm, no murmur or JVD.  S1 + S2 noted, no S3 or S4.  LUNGS:  Clear to auscultation bilaterally without rales/rhonchi/wheezing/retractions.  Symmetric chest rise on inhalation noted.  ABD:  Active bowel sounds, soft, non-tender/non-distended.  No rebound/guarding/rigidity.  EXT:  No edema or cyanosis.  Hands/feet warm to touch with good signs of peripheral perfusion.  No joint synovitis noted.  SKIN:  Dry to touch, no exanthems noted in the visualized areas.  NEURO:  Symmetric muscle strength, sensation to touch grossly intact.  No new focal deficits appreciated.    Data   Data reviewed today:  I personally reviewed no images or EKG's today.  No lab results found in last 7 days.    Recent Results (from the past 24 hour(s))   XR PELVIS 1 OR 2 VIEWS    Narrative    For Patients:  As a result of the 21st Century Cures Act, medical imaging exams  and procedure reports are released immediately into your electronic medical  record.  You may view this report before  your referring provider.  If you have  questions, please contact your health care provider.      Indication:  Left lower extremity pain, multiple falls.    Technique:  Pelvis 1 view.    Comparison:  None.    Findings:  Bones: Denies osteopenia. Alignment is normal. No fractures or bone lesions.    Joint spaces: Moderate arthrosis of the bilateral hips. Degenerative change in  lumbar spine and SI joints.     Soft tissues: Mild fecal retention within the visualized colon and rectum.     Impression:  No evidence of fracture.        Dictated by Jose Brannon MD @ 10/17/2023 8:36:01 PM    Electronically Signed   XR FEMUR LEFT 2 OR MORE VIEWS    Narrative    For Patients:  As a result of the 21st Century Cures Act, medical imaging exams  and procedure reports are released immediately into your electronic medical  record.  You may view this report before your referring provider.  If you have  questions, please contact your health care provider.      Indication:  Left lower extremity pain, multiple falls    Technique:  Left femur, 2 views.    Comparison:  None.    Findings:  No evidence of left femur fracture. Osteopenia. Knee effusion. Degenerative  changes of the hip and knee joints.     Impression:  No evidence of left femur fracture.        Dictated by Jose Brannon MD @ 10/17/2023 8:37:26 PM    Electronically Signed   XR TIBIA AND FIBULA LEFT 2 VIEWS    Narrative    For Patients:  As a result of the 21st Century Cures Act, medical imaging exams  and procedure reports are released immediately into your electronic medical  record.  You may view this report before your referring provider.  If you have  questions, please contact your health care provider.      Indication:  Multiple falls with left lower extremity pain     Technique:  Frontal and lateral views the left tibia and fibula.    Comparison:  This study was read in conjunction with left knee radiographs from same day.    Findings:  Bones: Alignment is normal. No  fractures or bone lesions.      Joint spaces: Unremarkable.      Soft tissues: Unremarkable.      Impression:  Negative.        Dictated by Chiquita Vaca MD @ 10/17/2023 8:41:06 PM    Electronically Signed   XR KNEE LEFT 3 VIEWS    Narrative    For Patients:  As a result of the 21st Century Cures Act, medical imaging exams  and procedure reports are released immediately into your electronic medical  record.  You may view this report before your referring provider.  If you have  questions, please contact your health care provider.      Indication:  Left lower extremity pain, left knee bruising, multiple falls    Technique:  Left knee 3 views     Comparison:  None      Impression:  No evidence of fracture. Moderate-sized knee effusion.    Moderate tricompartmental degenerative arthrosis.        Dictated by Jose Brannon MD @ 10/17/2023 8:33:08 PM    Electronically Signed

## 2023-10-18 NOTE — PROGRESS NOTES
ROOM # 230    Living Situation (if not independent, order SW consult): Home lives alone  Facility name:  : [facility] (Sister) [phone]    Activity level at baseline: Independent with walker  Activity level on admit: Assist of 1 with gait belt and walker    Who will be transporting you at discharge: TBD    Patient registered to observation; given Patient Bill of Rights; given the opportunity to ask questions about observation status and their plan of care.  Patient has been oriented to the observation room, bathroom and call light is in place.    Discussed discharge goals and expectations with patient/family.

## 2023-10-18 NOTE — PLAN OF CARE
PRIMARY DIAGNOSIS: GENERALIZED WEAKNESS    OUTPATIENT/OBSERVATION GOALS TO BE MET BEFORE DISCHARGE  1. Orthostatic performed: NA    2. Tolerating PO medications: Yes    3. Return to near baseline physical activity: No    4. Cleared for discharge by consultants (if involved): No    Discharge Planner Nurse   Safe discharge environment identified: No  Barriers to discharge: Yes       Entered by: Shanice Haq RN 10/18/2023 3:56 AM    Please review provider order for any additional goals.   Nurse to notify provider when observation goals have been met and patient is ready for discharge.Goal Outcome Evaluation:

## 2023-10-18 NOTE — PHARMACY-ADMISSION MEDICATION HISTORY
Received message from Rufus King MD.  Willyther states pt is no longer taking gabapentin 100mg daily and is taking amitriptyline 10mg at bedtime.  Updated PTA med list..          PTA Med List   Medication Sig Last Dose    acetaminophen (TYLENOL) 325 MG tablet Take 325-650 mg by mouth every 6 hours as needed for mild pain Past Week at ?    amitriptyline (ELAVIL) 10 MG tablet Take 10 mg by mouth at bedtime 10/17/2023 at pm    baclofen (LIORESAL) 10 MG tablet Take 10 mg by mouth 4 times daily 10/17/2023 at pm    dalfampridine (AMPYRA) 10 MG TB12 12 hr tablet Take 10 mg by mouth 2 times daily 10/17/2023 at supper    multivitamin w/minerals (THERA-VIT-M) tablet Take 1 tablet by mouth daily 10/17/2023 at am    omeprazole (PRILOSEC) 20 MG DR capsule Take 20 mg by mouth daily 10/17/2023 at am    PARoxetine (PAXIL) 20 MG tablet Take 20 mg by mouth daily at 2 pm 10/17/2023 at 1400    pramipexole (MIRAPEX) 0.125 MG tablet Take 0.125 mg by mouth at bedtime 10/17/2023 at hs    teriflunomide (AUBAGIO) 14 MG tablet Take 14 mg by mouth daily at 2 pm Only available through select specialty pharmacies 10/17/2023 at 1400    triamterene-HCTZ (MAXZIDE-25) 37.5-25 MG tablet Take 1 tablet by mouth daily 10/17/2023 at am    TURMERIC PO Take 1 tablet by mouth daily 10/17/2023 at am    VITAMIN D PO Take 1 tablet by mouth daily 10/17/2023 at am

## 2023-10-18 NOTE — PLAN OF CARE
Goal Outcome Evaluation:       PRIMARY DIAGNOSIS: GENERALIZED WEAKNESS    OUTPATIENT/OBSERVATION GOALS TO BE MET BEFORE DISCHARGE  1. Orthostatic performed: No    2. Tolerating PO medications: Yes    3. Return to near baseline physical activity: No    4. Cleared for discharge by consultants (if involved): No    Discharge Planner Nurse   Safe discharge environment identified: No  Barriers to discharge: Yes       Entered by: Dee Dee Antoine RN 10/18/2023 12:02 PM   Pt A/Ox4. VSS. RA. IV SL. Up 1 assist with walker to bathroom. Tolerating regular diet. Refuses pain. Pt states loose stools are normal. Discontinued enteric rule out/precautions. PRN pepto given. Home meds reconciled.  Please review provider order for any additional goals.   Nurse to notify provider when observation goals have been met and patient is ready for discharge.

## 2023-10-18 NOTE — DISCHARGE INSTRUCTIONS
Your home care referral was sent to [facility]  If you haven't heard from them within the next 24-48 hours,  Please call them at [phone].      Take ciprofloxacin 2 hours before multivitamin

## 2023-10-18 NOTE — PHARMACY-ADMISSION MEDICATION HISTORY
Pharmacist Admission Medication History    Admission medication history is complete. The information provided in this note is only as accurate as the sources available at the time of the update.    Information Source(s): Patient, CareEverywhere/SureScripts, and pt list  via in-person    Pertinent Information: pt states still taking hydrochlorothiazide/triameterene and it is on her personal list, but pharmacy has not filled since 6/2023 (90 day supply at that time).  Also states still taking gabapentin and pharmacy has not filled since 5/2023.    Changes made to PTA medication list:  Added: tylenol, mvi, turmeric, vit d, baclofen, dalfampridine, omeprazole, paxil, gabapenting, hydrochlorothiazide/triamterene, pramipexol, teriflunomide  Deleted: None  Changed: None    Medication Affordability: no       Allergies reviewed with patient and updates made in EHR: yes    Medication History Completed By: Eboni Mckeon [facility] 10/18/2023 10:59 AM    Prior to Admission medications    Medication Sig Last Dose Taking? Auth Provider Long Term End Date   acetaminophen (TYLENOL) 325 MG tablet Take 325-650 mg by mouth every 6 hours as needed for mild pain Past Week at ? Yes Unknown, Entered By History     baclofen (LIORESAL) 10 MG tablet Take 10 mg by mouth 4 times daily 10/17/2023 at pm Yes Unknown, Entered By History     dalfampridine (AMPYRA) 10 MG TB12 12 hr tablet Take 10 mg by mouth 2 times daily 10/17/2023 at supper Yes Unknown, Entered By History     gabapentin (NEURONTIN) 100 MG capsule Take 100 mg by mouth at bedtime 10/17/2023 at hs Yes Unknown, Entered By History Yes    multivitamin w/minerals (THERA-VIT-M) tablet Take 1 tablet by mouth daily 10/17/2023 at am Yes Unknown, Entered By History     omeprazole (PRILOSEC) 20 MG DR capsule Take 20 mg by mouth daily 10/17/2023 at am Yes Unknown, Entered By History     PARoxetine (PAXIL) 20 MG tablet Take 20 mg by mouth daily at 2 pm 10/17/2023 at 1400 Yes Unknown, Entered By  History Yes    pramipexole (MIRAPEX) 0.125 MG tablet Take 0.125 mg by mouth at bedtime 10/17/2023 at hs Yes Unknown, Entered By History Yes    teriflunomide (AUBAGIO) 14 MG tablet Take 14 mg by mouth daily at 2 pm Only available through select specialty pharmacies 10/17/2023 at 1400 Yes Unknown, Entered By History     triamterene-HCTZ (MAXZIDE-25) 37.5-25 MG tablet Take 1 tablet by mouth daily 10/17/2023 at am Yes Unknown, Entered By History Yes    TURMERIC PO Take 1 tablet by mouth daily 10/17/2023 at am Yes Unknown, Entered By History     VITAMIN D PO Take 1 tablet by mouth daily 10/17/2023 at am Yes Unknown, Entered By History

## 2023-10-18 NOTE — CONSULTS
Care Management Initial Consult    General Information  Assessment completed with: Opal Longoria  Type of CM/SW Visit: Initial Assessment    Primary Care Provider verified and updated as needed: Yes   Readmission within the last 30 days:     Reason for Consult: discharge planning  Advance Care Planning: Advance Care Planning Reviewed: no concerns identified       Communication Assessment  Patient's communication style: spoken language (English or Bilingual)    Hearing Difficulty or Deaf: no      Cognitive  Cognitive/Neuro/Behavioral: WDL                      Living Environment:   People in home: alone     Current living Arrangements: apartment      Able to return to prior arrangements: yes     Family/Social Support:  Care provided by: self  Provides care for: no one  Marital Status: Single  Children          Description of Support System: Supportive, Involved    Support Assessment: Adequate family and caregiver support, Adequate social supports    Current Resources:   Patient receiving home care services: No  Equipment currently used at home: walker, rolling, electric scooter  Supplies currently used at home:      Employment/Financial:  Employment Status: retired      Financial Concerns: insurance, none   Referral to Financial Worker: No     Does the patient's insurance plan have a 3 day qualifying hospital stay waiver?  No    Lifestyle & Psychosocial Needs:  Social Determinants of Health     Food Insecurity: Not on file   Depression: Not on file   Housing Stability: Not on file   Tobacco Use: Not on file   Financial Resource Strain: Not on file   Alcohol Use: Not on file   Transportation Needs: Not on file   Physical Activity: Not on file   Interpersonal Safety: Not on file   Stress: Not on file   Social Connections: Not on file     Functional Status:  Prior to admission patient needed assistance: Patient was admitted under observation status for weakness, fall at home.     Met with patient to complete initial  assessment. Patient reports that she lives alone at [facility]. She has a rolling walker and electric scooter at home. She reports being independent with ADLs at baseline. Pt reports history of MS and reports that she was managing well at home despite 2 recent falls. Patient reports that she has assistance with cleaning monthly.      PT evaluated patient and recommend TCU. SW discussed and provided SNF packet. Patient would prefer to return home with HC. SW reviewed PT recs for recs of Ax[facility] if she returned home. She reports that her daughter Lisa has taken time off of work and can assist patient.     With patient's permission, placed call to daughter Lisa to further discuss discharge planning. Awaiting return call.     Reviewed BOWIE letter with patient.     Mental Health Status:  Mental Health Status: No Current Concerns       Chemical Dependency Status:  Chemical Dependency Status: No Current Concerns           Values/Beliefs:  Spiritual, Cultural Beliefs, Muslim Practices, Values that affect care: Yes             Additional Information:  Plan: Home with HC and daughter assist vs TCU, awaiting call back from daughter. Initial HC referral sent for HC PT/OT, awaiting accepting agency. SW will continue to follow.     1515: SW spoke with daughter via phone. She is agreeable to pt returning home with HC PT/OT/HHA. She will assist pt as needed at home. Dtr will transport home, and is available anytime tomorrow if she is medically ready for discharge. [facility] has accepted HC referral. AVS updated.     JUN Muñiz, [facility]   Inpatient Care Coordination  [facility]   [phone]

## 2023-10-18 NOTE — PROGRESS NOTES
"   10/18/23 4696   Appointment Info   Signing Clinician's Name / Credentials (PT) Yazan Beltran DPT   Living Environment   People in Home alone   Current Living Arrangements independent living facility   Home Accessibility no concerns   Living Environment Comments Pt lives alone in apartment, pt has cleaning help. Order groceries that are delivered to home. Pt cooks lunch and dinner (breakfast from building). Pt stated that daughter took time off to stay with pt once returning to [facility].   Self-Care   Usual Activity Tolerance moderate   Current Activity Tolerance fair   Equipment Currently Used at Home walker, rolling;other (see comments)  (electric scooter)   Fall history within last six months yes   Number of times patient has fallen within last six months 4   Activity/Exercise/Self-Care Comment Pt is IND with functional mobility, use 4WW for ambulation. Pt owns electric scooter, use it going [place].   General Information   Onset of Illness/Injury or Date of Surgery 10/18/23   Referring Physician Mai Mims MD   Patient/Family Therapy Goals Statement (PT) return home,.   Pertinent History of Current Problem (include personal factors and/or comorbidities that impact the POC) Pt is 74 yo female who, per chart, \"ast medical history of multiple sclerosis, ataxic gait, optic neuropathy, restless leg syndrome, hypertension, depression, who lives independently was sent for evaluation for frequent falls.    Found at Urgent Care to have UTI. Direct admission to [facility].\"   Existing Precautions/Restrictions fall   Cognition   Affect/Mental Status (Cognition) WFL   Orientation Status (Cognition) oriented x 4   Pain Assessment   Patient Currently in Pain No   Integumentary/Edema   Integumentary/Edema Comments pitting edema 2+ in R foot, 3+ in R ankle and distal lower leg, 1-2+ in proximal lower leg. L LE 1-2+ from toes to knee.   Posture    Posture Forward head position   Range of Motion (ROM)   Range of " Motion ROM deficits secondary to weakness   ROM Comment B LEs with significant weakness, needing UE to perform bed mobility and to mobilize legs at EOB.   Strength (Manual Muscle Testing)   Strength (Manual Muscle Testing) Deficits observed during functional mobility   Bed Mobility   Bed Mobility supine-sit   Comment, (Bed Mobility) SBA, use of UEs to progress LEs to EOB, from elevated HOB.   Transfers   Transfers sit-stand transfer   Comment, (Transfers) 4WW and Jalen. pt initially pushing up from walker, unable to stand, pushes up from bed and fully extends B knees.   Gait/Stairs (Locomotion)   Comment, (Gait/Stairs) Pt ambulated 10' with FWW and CG-Jalen, Knee locked in textension, decreased clearance during swing (scuffing feet on floor), and circumducting B LEs. very slow gait speed.   Balance   Balance Comments mild unsteadiness throughout gait, poor/fair with turning and pivots.   Sensory Examination   Sensory Perception patient reports no sensory changes   Clinical Impression   Criteria for Skilled Therapeutic Intervention Yes, treatment indicated   PT Diagnosis (PT) impaired functional mobility   Influenced by the following impairments pain, decreased strength and activity tolerance, edema.   Functional limitations due to impairments difficulty with bed mobility, transfers, ambulation.   Clinical Presentation (PT Evaluation Complexity) stable   Clinical Presentation Rationale clinical judgement.   Clinical Decision Making (Complexity) low complexity   Planned Therapy Interventions (PT) balance training;bed mobility training;gait training;home exercise program;neuromuscular re-education;ROM (range of motion);strengthening;stretching;transfer training;wheelchair management/propulsion training;progressive activity/exercise   Risk & Benefits of therapy have been explained evaluation/treatment results reviewed;care plan/treatment goals reviewed;risks/benefits reviewed;current/potential barriers  reviewed;participants voiced agreement with care plan;participants included;patient   PT Total Evaluation Time   PT Eval, Low Complexity Minutes (48620) 13   Therapy Certification   Start of care date 10/18/23   Certification date from 10/18/23   Certification date to 10/25/23   Medical Diagnosis frequent falls.   Physical Therapy Goals   PT Frequency 5x/week   PT Predicted Duration/Target Date for Goal Attainment 10/25/23   PT Goals Bed Mobility;Transfers;Gait   PT: Bed Mobility Modified independent;Supine to/from sit   PT: Transfers Supervision/stand-by assist;Sit to/from stand;Assistive device   PT: Gait Supervision/stand-by assist;Rolling walker;50 feet   Interventions   Interventions Quick Adds Gait Training;Therapeutic Activity   Therapeutic Activity   Therapeutic Activities: dynamic activities to improve functional performance Minutes (36745) 13   Symptoms Noted During/After Treatment Fatigue   Treatment Detail/Skilled Intervention Pt supine in bed upon PT arrival. Pt's daughter present and exited room. Pt performed STS x5 during session, 4WW and Jalen, progressed to CGA, then needing Jalen again due to fatigue. Pt cued for locking 4WW brakes, UE placement, required repeated verbal cues. Pt performed sit > supine min/modA of LEs to get into bed. Pt states she occasional uses leg loop to assist with this. Pt supine in bed, alarm on, all needs within reach. Pt had noted edema pitting in B LEs, education on elevation and continued use of pt's compression garments for decreased edema. Nurse updated.   Gait Training   Gait Training Minutes (00879) 14   Symptoms Noted During/After Treatment (Gait Training) fatigue;increased pain   Treatment Detail/Skilled Intervention Pt ambulated 75' with 4WW and CG-Jalen, cues on walker proximity and for increased step amplitude (increse foot clearance). Pt unable to perform, pt with knees locked into extension throughout gait with circumduction to facilitate clearance during swing  phase. Pt educated on proper fitting of walker, walker height adjuted. Pt ambulated ~30' x2 with 4WW and CGA, cues on parking walker, engaging brakes, and pivoting to sit and break during gait when fatigued. Jalen required for parking/sitting, additional cues on UE/LE positioning to facilitate pivot to sit.   PT Discharge Planning   PT Plan progress IND with transfers (gradually lower bed height), gait with 4WW (maybe trial FWW for increased UE support); practice pivots, parking, sitting with 4WW.   PT Discharge Recommendation (DC Rec) Transitional Care Facility;home with assist;home with home care physical therapy   PT Rationale for DC Rec Pt at baseline is IND with ADLs, uses 4WW for ambulation (now owns electric scooter but doesn't use much at baseline). Lives in [facility] alone. Pt is currently near baseline, mild increase in deficits with strength noted, pt needing Jalen for transfers and gait, unsteady with pivoting and turning with 4WW. Pt is a high falls risk. Recommending TCF. If pt has Ax1 for all mobility at [facility], pt may be able to return to home. Long-term recommendation would be more assistive living facility given ongoing risk for falls.   PT Brief overview of current status sup > sit SBA, sit > sup min/modA, STS Jalen, gait 4WW CG-Jalen.   PT Equipment Needed at Discharge   (TBD)   Total Session Time   Timed Code Treatment Minutes 27   Total Session Time (sum of timed and untimed services) 40     [facility]  OUTPATIENT PHYSICAL THERAPY EVALUATION  PLAN OF TREATMENT FOR OUTPATIENT REHABILITATION  (COMPLETE FOR INITIAL CLAIMS ONLY)  Patient's Last Name, First Name, M.I.  YOB: 1950  Opal Bobo                        Provider's Name  [facility] Medical Record No.  [id number]                             Onset Date:  10/18/23   Start of Care Date:  10/18/23   Type:     _X_PT   ___OT   ___SLP Medical Diagnosis:  frequent falls.               PT Diagnosis:  impaired functional mobility Visits from SOC:  1     See note for plan of treatment, functional goals and certification details    I CERTIFY THE NEED FOR THESE SERVICES FURNISHED UNDER        THIS PLAN OF TREATMENT AND WHILE UNDER MY CARE     (Physician co-signature of this document indicates review and certification of the therapy plan).

## 2023-10-19 VITALS
OXYGEN SATURATION: 97 % | HEIGHT: 66 IN | DIASTOLIC BLOOD PRESSURE: 84 MMHG | RESPIRATION RATE: 16 BRPM | SYSTOLIC BLOOD PRESSURE: 163 MMHG | WEIGHT: 180.78 LBS | HEART RATE: 85 BPM | TEMPERATURE: 98.1 F | BODY MASS INDEX: 29.05 KG/M2

## 2023-10-19 PROCEDURE — G0378 HOSPITAL OBSERVATION PER HR: HCPCS

## 2023-10-19 PROCEDURE — 250N000013 HC RX MED GY IP 250 OP 250 PS 637: Performed by: HOSPITALIST

## 2023-10-19 PROCEDURE — 250N000013 HC RX MED GY IP 250 OP 250 PS 637: Performed by: INTERNAL MEDICINE

## 2023-10-19 PROCEDURE — 96372 THER/PROPH/DIAG INJ SC/IM: CPT | Performed by: HOSPITALIST

## 2023-10-19 PROCEDURE — 250N000011 HC RX IP 250 OP 636: Mod: JZ | Performed by: HOSPITALIST

## 2023-10-19 PROCEDURE — 99239 HOSP IP/OBS DSCHRG MGMT >30: CPT | Performed by: HOSPITALIST

## 2023-10-19 RX ORDER — CIPROFLOXACIN 250 MG/1
250 TABLET, FILM COATED ORAL EVERY 12 HOURS
Qty: 3 TABLET | Refills: 0 | Status: SHIPPED | OUTPATIENT
Start: 2023-10-19

## 2023-10-19 RX ADMIN — ENOXAPARIN SODIUM 40 MG: 40 INJECTION SUBCUTANEOUS at 11:58

## 2023-10-19 RX ADMIN — CIPROFLOXACIN HYDROCHLORIDE 250 MG: 250 TABLET, FILM COATED ORAL at 09:08

## 2023-10-19 RX ADMIN — BACLOFEN 10 MG: 10 TABLET ORAL at 09:08

## 2023-10-19 RX ADMIN — BACLOFEN 10 MG: 10 TABLET ORAL at 11:58

## 2023-10-19 RX ADMIN — TRIAMTERENE AND HYDROCHLOROTHIAZIDE 1 TABLET: 37.5; 25 TABLET ORAL at 09:08

## 2023-10-19 RX ADMIN — DALFAMPRIDINE 10 MG: 10 TABLET, FILM COATED, EXTENDED RELEASE ORAL at 09:11

## 2023-10-19 RX ADMIN — PANTOPRAZOLE SODIUM 40 MG: 40 TABLET, DELAYED RELEASE ORAL at 09:08

## 2023-10-19 ASSESSMENT — ACTIVITIES OF DAILY LIVING (ADL)
ADLS_ACUITY_SCORE: 36

## 2023-10-19 NOTE — PROGRESS NOTES
Care Management Discharge Note    Discharge Date: 10/19/2023     Discharge Disposition:  Home    Discharge Services:  Home Care PT/OT/HHA    Discharge Transportation:  Daughter Lisa 11am    Private pay costs discussed: Not applicable    Does the patient's insurance plan have a 3 day qualifying hospital stay waiver?  No    Education Provided on the Discharge Plan:  Yes  Persons Notified of Discharge Plans: Patient, daughter, [facility]  Patient/Family in Agreement with the Plan:  Yes    Handoff Referral Completed: No    Additional Information:  Home with HC PT/OT/HHA. [facility]V accepted, AVS updated. Orders faxed. FAISAL spoke with patient's daughter Lisa this morning, she will transport patient home around 11am. RN updated. SW will remain available for any further needs.     JUN Muñiz, [facility]   Inpatient Care Coordination  [facility]   [phone]

## 2023-10-19 NOTE — PLAN OF CARE
PRIMARY DIAGNOSIS: GENERALIZED WEAKNESS    OUTPATIENT/OBSERVATION GOALS TO BE MET BEFORE DISCHARGE  1. Orthostatic performed: No    2. Tolerating PO medications: Yes    3. Return to near baseline physical activity: No    4. Cleared for discharge by consultants (if involved): No    Discharge Planner Nurse   Safe discharge environment identified: No  Barriers to discharge: Yes       Entered by: Shanice Haq RN 10/19/2023 5:54 AM   AOx4, VSS, denies pain, Assist of 2 with gait belt and walker, ataxia gait, PIV SL, PT recommends TCU, plan to discharge home with services and help from daughter TBD  Please review provider order for any additional goals.   Nurse to notify provider when observation goals have been met and patient is ready for discharge.

## 2023-10-19 NOTE — PLAN OF CARE
PRIMARY DIAGNOSIS: GENERALIZED WEAKNESS    OUTPATIENT/OBSERVATION GOALS TO BE MET BEFORE DISCHARGE  1. Orthostatic performed: No    2. Tolerating PO medications: Yes    3. Return to near baseline physical activity: No    4. Cleared for discharge by consultants (if involved): No    Discharge Planner Nurse   Safe discharge environment identified: No  Barriers to discharge: Yes       Entered by: Shanice Haq RN 10/19/2023 5:52 AM     Please review provider order for any additional goals.   Nurse to notify provider when observation goals have been met and patient is ready for discharge.

## 2023-10-19 NOTE — DISCHARGE SUMMARY
"[facility]  Hospitalist Discharge Summary      Date of Admission:  10/18/2023  Date of Discharge:  10/19/2023  Discharging Provider: Rufus King MD  Discharge Service: Hospitalist Service    Discharge Diagnoses   Falls  Weakness  UTI  MS    Clinically Significant Risk Factors     # Overweight: Estimated body mass index is 29.18 kg/m  as calculated from the following:    Height as of this encounter: 1.676 m (5' 6\").    Weight as of this encounter: 82 kg (180 lb 12.4 oz).       Follow-ups Needed After Discharge   Follow-up Appointments     Follow-up and recommended labs and tests       Follow up with primary care provider, Augustine Song, within 7 days for   hospital follow- up.  No follow up labs or test are needed.          Unresulted Labs Ordered in the Past 30 Days of this Admission       No orders found for last 31 day(s).        These results will be followed up by na    Discharge Disposition   Discharged to home  Condition at discharge: Stable    Hospital Course   Opal Bobo is a 73 year old female patient with past medical history of multiple sclerosis, ataxic gait, optic neuropathy, restless leg syndrome, hypertension, depression, who lives independently was sent for evaluation for frequent falls.  Patient is stated that she had a fall on Monday and again on Sunday.  She also states that she had prior history of falls. She was seen at urgent care in [place] and was sent for direct admit for further evaluation and possible placement.  Per chart review, patient was diagnosed with MS about 18 years ago.  She has been having ataxic gait for which neurology recommended physical therapy.  Patient also states that she has leg edema.  She denies history of congestive heart failure.  She denies dysuria, urgency or frequency.  She has no vomiting or diarrhea.  Her vital signs showed temperature 97.5, pulse 69, blood pressure 168/92, oxygen saturation 98% on room air.  Laboratory " work-up found urgent care showed WBC 20-50, 2+ leukocyte esterase.  Urine culture was ordered.  Antibiotic was not administered.  Patient was sent for direct admission for further evaluation.    Falls  History of ataxic gait  Multiple sclerosis    - cont home: baclofen, dalfampridne, teriflunomide    - follows with Dr Caicedo: his last note was reviewed    - has prior history of falls    - lives independently    - was seen at urgent care in [place] and was sent for direct admit for treatment of UTI, PT evaluation and recommendations for disposition    - PT consult is pending     Abnormal urinalysis    - UA done at Urgent Care appeared infected    - culture is pending    - started on Cipro, cont     History of hypertension    - cont home Maxzide     Restless legs    - cont mirapex     Depression    - cont prozac and amitriptyline     Patient is doing well.  She was seen by physical therapy had recommended TCU.  She is choosing to discharge home.  She will have help with her daughter.    I will order home care services.  I sat and had a long conversation  with her about the future.  Indicated likely at some point she needs to be in a facility where she can call in more resources if she needs it.  I have called her daughter and updated her.  I will fill 3 more doses of ciprofloxacin.  I checked care everywhere, there are no final urine cultures and her results at this point.  Patient will discharge home.    Consultations This Hospital Stay   PHYSICAL THERAPY ADULT IP CONSULT  CARE MANAGEMENT / SOCIAL WORK IP CONSULT    Code Status   Full Code    Time Spent on this Encounter   I, Rufus King MD, personally saw the patient today and spent greater than 30 minutes discharging this patient.       Rufus King MD  [facility] OBSERVATION DEPT  [facility] [address] [place] [address]  Phone: [phone]  ______________________________________________________________________    Physical Exam    Vital Signs: Temp: 98.1  F (36.7  C) Temp src: Oral BP: (!) 168/89 Pulse: 72   Resp: 16 SpO2: 96 % O2 Device: None (Room air)    Weight: 180 lbs 12.44 oz  Constitutional: awake, alert, cooperative, no apparent distress, and appears stated age  Eyes: Lids and lashes normal, pupils equal, round and reactive to light, extra ocular muscles intact, sclera clear, conjunctiva normal  ENT: Normocephalic, without obvious abnormality, atraumatic, sinuses nontender on palpation, external ears without lesions, oral pharynx with moist mucous membranes, tonsils without erythema or exudates, gums normal and good dentition.  Respiratory: No increased work of breathing, good air exchange, clear to auscultation bilaterally, no crackles or wheezing  Cardiovascular: Normal apical impulse, regular rate and rhythm, normal S1 and S2, no S3 or S4, and no murmur noted  GI: No scars, normal bowel sounds, soft, non-distended, non-tender, no masses palpated, no hepatosplenomegally       Primary Care Physician   Augustine Song    Discharge Orders      Home Care Referral      Reason for your hospital stay    Falls, weakness  Urinary tract infection     Follow-up and recommended labs and tests     Follow up with primary care provider, Augustine Song, within 7 days for hospital follow- up.  No follow up labs or test are needed.     Activity    Your activity upon discharge: activity as tolerated     Diet    Follow this diet upon discharge: Orders Placed This Encounter      Regular Diet Adult       Significant Results and Procedures   Most Recent 3 CBC's:  Recent Labs   Lab Test 10/18/23  0503   WBC 3.8*   HGB 13.7   MCV 98        Most Recent 3 BMP's:  Recent Labs   Lab Test 10/18/23  0503      POTASSIUM 3.6   CHLORIDE 107   CO2 26   BUN 13.2   CR 0.60   ANIONGAP 10   LISA 8.7*   GLC 93     Most Recent 2 LFT's:No lab results found., No results found for this or any previous visit.    Discharge Medications   Current Discharge  Medication List        START taking these medications    Details   ciprofloxacin (CIPRO) 250 MG tablet Take 1 tablet (250 mg) by mouth every 12 hours  Qty: 3 tablet, Refills: 0    Associated Diagnoses: Urinary tract infection without hematuria, site unspecified           CONTINUE these medications which have NOT CHANGED    Details   acetaminophen (TYLENOL) 325 MG tablet Take 325-650 mg by mouth every 6 hours as needed for mild pain      amitriptyline (ELAVIL) 10 MG tablet Take 10 mg by mouth at bedtime      baclofen (LIORESAL) 10 MG tablet Take 10 mg by mouth 4 times daily      dalfampridine (AMPYRA) 10 MG TB12 12 hr tablet Take 10 mg by mouth 2 times daily      multivitamin w/minerals (THERA-VIT-M) tablet Take 1 tablet by mouth daily      omeprazole (PRILOSEC) 20 MG DR capsule Take 20 mg by mouth daily      PARoxetine (PAXIL) 20 MG tablet Take 20 mg by mouth daily at 2 pm      pramipexole (MIRAPEX) 0.125 MG tablet Take 0.125 mg by mouth at bedtime      teriflunomide (AUBAGIO) 14 MG tablet Take 14 mg by mouth daily at 2 pm Only available through select specialty pharmacies      triamterene-HCTZ (MAXZIDE-25) 37.5-25 MG tablet Take 1 tablet by mouth daily      TURMERIC PO Take 1 tablet by mouth daily      VITAMIN D PO Take 1 tablet by mouth daily           Allergies   Allergies   Allergen Reactions    Clindamycin/Lincomycin     Cefadroxil

## 2023-10-19 NOTE — PLAN OF CARE
PRIMARY DIAGNOSIS: Generalized Weakness  OUTPATIENT/OBSERVATION GOALS TO BE MET BEFORE DISCHARGE:  ADLs back to baseline: No    Activity and level of assistance: Up with maximum assistance. Consider SW and/or PT evaluation.     Pain status: Pain free.    Return to near baseline physical activity: Yes     Discharge Planner Nurse   Safe discharge environment identified: Yes  Barriers to discharge: No       Entered by: Korey Lewis RN 10/19/2023 11:02 AM   Pt is A/Ox4. Tolerating oral intake. Denies pain this morning. Denies chest pain or SOB. Discharging home with daughter. Pt refused TCU and will have homecare services at home with daughter helping.  Please review provider order for any additional goals.   Nurse to notify provider when observation goals have been met and patient is ready for discharge.

## 2023-10-19 NOTE — PLAN OF CARE
"Physical Therapy Discharge Summary    Reason for therapy discharge:    Discharged to home with home therapy.    Progress towards therapy goal(s). See goals on Care Plan in [facility] electronic health record for goal details.  Goals not met.  Barriers to achieving goals:   discharge from facility.    Therapy recommendation(s):    Continued therapy is recommended.  Rationale/Recommendations:  Per prior PT recommendation, \"Pt at baseline is IND with ADLs, uses 4WW for ambulation (now owns electric scooter but doesn't use much at baseline). [place] [place]. Pt is currently near baseline, mild increase in deficits with strength noted, pt needing Jalen for transfers and gait, unsteady with pivoting and turning with 4WW. Pt is a high falls risk. Recommending TCF. If pt has Ax1 for all mobility at [facility] [place], pt may be able to return to home. Long-term recommendation would be more assistive living facility given ongoing risk for falls\".      "

## 2023-10-19 NOTE — PLAN OF CARE
"PRIMARY DIAGNOSIS: GENERALIZED WEAKNESS    OUTPATIENT/OBSERVATION GOALS TO BE MET BEFORE DISCHARGE  1. Orthostatic performed: No    2. Tolerating PO medications: Yes    3. Return to near baseline physical activity: No    4. Cleared for discharge by consultants (if involved): No    Discharge Planner Nurse   Safe discharge environment identified: No  Barriers to discharge: Yes       Entered by: Luann Amezquita RN 10/18/2023   Patient A & O x 4, VSS, on RA. Bowel sounds active, LBM 10/18. Assist x 1 with walker/gait belt. Denies pain/SOB NV, tolerating regular diet. Refuses pain. PIV SL, PT evaluated pt and recommended TCU. SW is following for safe placement.  BP (!) 142/79 (BP Location: Right arm)   Pulse 89   Temp 97.5  F (36.4  C) (Oral)   Resp 16   Ht 1.676 m (5' 6\")   Wt 82 kg (180 lb 12.4 oz)   SpO2 93%   BMI 29.18 kg/m       Please review provider order for any additional goals.   Nurse to notify provider when observation goals have been met and patient is ready for discharge.          "

## 2023-10-19 NOTE — PLAN OF CARE
Patient's After Visit Summary was reviewed with patient and/or family.   Patient verbalized understanding of After Visit Summary, recommended follow up and was given an opportunity to ask questions. IV removed  Discharge medications sent home with patient/family: YES  Discharged with daughter

## 2023-10-20 ENCOUNTER — PATIENT OUTREACH (OUTPATIENT)
Dept: CARE COORDINATION | Facility: CLINIC | Age: 73
End: 2023-10-20
Payer: COMMERCIAL

## 2023-10-20 NOTE — PROGRESS NOTES
"Clinic Care Coordination Contact  [facility]: Post-Discharge Note  SITUATION                                                      Admission:    Admission Date: 10/18/23   Reason for Admission: Falls  Discharge:   Discharge Date: 10/19/23  Discharge Diagnosis: Falls    BACKGROUND                                                      Per hospital discharge summary and inpatient provider notes:Opal Bobo is a 73 year old female patient with past medical history of multiple sclerosis, ataxic gait, optic neuropathy, restless leg syndrome, hypertension, depression, who lives independently was sent for evaluation for frequent falls.  Patient is stated that she had a fall on Monday and again on Sunday.  She also states that she had prior history of falls. She was seen at urgent care in [place] and was sent for direct admit for further evaluation and possible placement.  Per chart review, patient was diagnosed with MS about 18 years ago.  She has been having ataxic gait for which neurology recommended physical therapy.  Patient also states that she has leg edema.  She denies history of congestive heart failure.  She denies dysuria, urgency or frequency.  She has no vomiting or diarrhea.  Her vital signs showed temperature 97.5, pulse 69, blood pressure 168/92, oxygen saturation 98% on room air.  Laboratory work-up found urgent care showed WBC 20-50, 2+ leukocyte esterase.  Urine culture was ordered.  Antibiotic was not administered.  Patient was sent for direct admission for further evaluation.      ASSESSMENT           Discharge Assessment  How are you doing now that you are home?: pt's daughter stated\" she is doing well, PT came to do an assessment\"  How are your symptoms? (Red Flag symptoms escalate to triage hotline per guidelines): Improved  Do you feel your condition is stable enough to be safe at home until your provider visit?: Yes  Does the patient have their discharge instructions? : Yes  Does the " patient have questions regarding their discharge instructions? : No  Were you started on any new medications or were there changes to any of your previous medications? : Yes  Does the patient have all of their medications?: Yes  Do you have questions regarding any of your medications? : No  Do you have all of your needed medical supplies or equipment (DME)?  (i.e. oxygen tank, CPAP, cane, etc.): Yes  Discharge follow-up appointment scheduled within 14 calendar days? : No (daughter will call PCP clinic)    Post-op ([facility] Only)  If the patient had a surgery or procedure, do they have any questions for a nurse?: No           PLAN                                                      Outpatient Plan: Follow-up Appointments     Follow-up and recommended labs and tests       Follow up with primary care provider, Augustine Song, within 7 days for   hospital follow- up.  No follow up labs or test are needed    No future appointments.      For any urgent concerns, please contact our 24 hour nurse triage line: [phone] ([phone])         Rishabh Spear

## 2024-08-06 ENCOUNTER — LAB REQUISITION (OUTPATIENT)
Dept: LAB | Facility: CLINIC | Age: 74
End: 2024-08-06
Payer: COMMERCIAL

## 2024-08-06 DIAGNOSIS — L03.115 CELLULITIS OF RIGHT LOWER LIMB: ICD-10-CM

## 2024-08-07 LAB
ANION GAP SERPL CALCULATED.3IONS-SCNC: 13 MMOL/L (ref 7–15)
BASOPHILS # BLD AUTO: 0.1 10E3/UL (ref 0–0.2)
BASOPHILS NFR BLD AUTO: 2 %
BUN SERPL-MCNC: 18 MG/DL (ref 8–23)
CALCIUM SERPL-MCNC: 9.2 MG/DL (ref 8.8–10.4)
CHLORIDE SERPL-SCNC: 101 MMOL/L (ref 98–107)
CREAT SERPL-MCNC: 0.61 MG/DL (ref 0.51–0.95)
CRP SERPL HS-MCNC: 2.76 MG/L
EGFRCR SERPLBLD CKD-EPI 2021: >90 ML/MIN/1.73M2
EOSINOPHIL # BLD AUTO: 0.2 10E3/UL (ref 0–0.7)
EOSINOPHIL NFR BLD AUTO: 7 %
ERYTHROCYTE [DISTWIDTH] IN BLOOD BY AUTOMATED COUNT: 12.8 % (ref 10–15)
GLUCOSE SERPL-MCNC: 89 MG/DL (ref 70–99)
HCO3 SERPL-SCNC: 26 MMOL/L (ref 22–29)
HCT VFR BLD AUTO: 45.6 % (ref 35–47)
HGB BLD-MCNC: 14.8 G/DL (ref 11.7–15.7)
IMM GRANULOCYTES # BLD: 0 10E3/UL
IMM GRANULOCYTES NFR BLD: 0 %
LYMPHOCYTES # BLD AUTO: 0.9 10E3/UL (ref 0.8–5.3)
LYMPHOCYTES NFR BLD AUTO: 27 %
MCH RBC QN AUTO: 31.8 PG (ref 26.5–33)
MCHC RBC AUTO-ENTMCNC: 32.5 G/DL (ref 31.5–36.5)
MCV RBC AUTO: 98 FL (ref 78–100)
MONOCYTES # BLD AUTO: 0.5 10E3/UL (ref 0–1.3)
MONOCYTES NFR BLD AUTO: 15 %
NEUTROPHILS # BLD AUTO: 1.7 10E3/UL (ref 1.6–8.3)
NEUTROPHILS NFR BLD AUTO: 49 %
NRBC # BLD AUTO: 0 10E3/UL
NRBC BLD AUTO-RTO: 0 /100
PLATELET # BLD AUTO: 289 10E3/UL (ref 150–450)
POTASSIUM SERPL-SCNC: 3.5 MMOL/L (ref 3.4–5.3)
RBC # BLD AUTO: 4.65 10E6/UL (ref 3.8–5.2)
SODIUM SERPL-SCNC: 140 MMOL/L (ref 135–145)
WBC # BLD AUTO: 3.4 10E3/UL (ref 4–11)

## 2024-08-07 PROCEDURE — 36415 COLL VENOUS BLD VENIPUNCTURE: CPT | Mod: ORL | Performed by: NURSE PRACTITIONER

## 2024-08-07 PROCEDURE — 80048 BASIC METABOLIC PNL TOTAL CA: CPT | Mod: ORL | Performed by: NURSE PRACTITIONER

## 2024-08-07 PROCEDURE — 86141 C-REACTIVE PROTEIN HS: CPT | Mod: ORL | Performed by: NURSE PRACTITIONER

## 2024-08-07 PROCEDURE — 85025 COMPLETE CBC W/AUTO DIFF WBC: CPT | Mod: ORL | Performed by: NURSE PRACTITIONER

## 2024-08-07 PROCEDURE — P9603 ONE-WAY ALLOW PRORATED MILES: HCPCS | Mod: ORL | Performed by: NURSE PRACTITIONER
